# Patient Record
Sex: FEMALE | Race: BLACK OR AFRICAN AMERICAN
[De-identification: names, ages, dates, MRNs, and addresses within clinical notes are randomized per-mention and may not be internally consistent; named-entity substitution may affect disease eponyms.]

---

## 2017-10-27 ENCOUNTER — HOSPITAL ENCOUNTER (EMERGENCY)
Dept: HOSPITAL 62 - ER | Age: 22
LOS: 1 days | Discharge: HOME | End: 2017-10-28
Payer: MEDICAID

## 2017-10-27 VITALS — SYSTOLIC BLOOD PRESSURE: 121 MMHG | DIASTOLIC BLOOD PRESSURE: 94 MMHG

## 2017-10-27 DIAGNOSIS — L21.0: Primary | ICD-10-CM

## 2017-10-27 PROCEDURE — 99283 EMERGENCY DEPT VISIT LOW MDM: CPT

## 2017-10-28 NOTE — ER DOCUMENT REPORT
HPI





- HPI


Pain Level: 2


Notes: 





Patient is a 22-year-old female who presents to the ED complaining of a 

continued rash over the last month to her extremities and a few areas on her 

trunk.  Patient states that the lesions are annular and her PCM possibly 

thought that it could have been Lyme's disease, but patient has not been 

exposed any tick bites or been out in the woods at all.  She denies any fevers 

or joint pains.  Patient states that aside from itching she is otherwise 

asymptomatic.  She still eating and drink without any difficulties.  She is not 

taking any daily p.o. medications.  Denies any exposure to new chemicals or 

detergents or soaps.  Denies any insect bites.  Denies any recent illness.  

Denies any headache, fever, URI, sore throat, chest pain, palpitations, syncope

, cough, shortness of breath, wheeze, dyspnea, abdominal pain, nausea/vomiting/

diarrhea, urinary retention, dysuria, hematuria, joint pains.





- ROS


Notes: 





REVIEW OF SYSTEMS:


CONSTITUTIONAL :  Denies fever,  chills, or sweats.  Denies recent illness.


EENT:   Denies eye, ear, throat, or mouth pain or symptoms.  Denies nasal or 

sinus congestion or discharge.  Denies throat, tongue, or mouth swelling or 

difficulty swallowing.


CARDIOVASCULAR:  Denies chest pain.  Denies palpitations or racing or irregular 

heart beat.  Denies ankle edema.


RESPIRATORY:  Denies cough, cold, or chest congestion.  Denies shortness of 

breath, difficulty breathing, or wheezing.


GASTROINTESTINAL:  Denies abdominal pain or distention.  Denies nausea, vomiting

, or diarrhea.  Denies blood in vomitus, stools, or per rectum.  Denies black, 

tarry stools.  Denies constipation.  


GENITOURINARY:  Denies difficulty urinating, painful urination, burning, 

frequency, blood in urine, or discharge.


MUSCULOSKELETAL:  Denies back or neck pain or stiffness.  Denies joint pain or 

swelling.


SKIN:   see hpi


NEUROLOGICAL:  Denies confusion or altered mental status.  Denies passing out 

or loss of consciousness.  Denies dizziness or lightheadedness.  Denies 

headache.  Denies weakness or paralysis or loss of use of either side.  Denies 

problems with gait or speech.  Denies sensory loss, numbness, or tingling. 





ALL OTHER SYSTEMS REVIEWED AND NEGATIVE.





Dictation was performed using Dragon voice recognition software





- CARDIOVASCULAR


Cardiovascular: DENIES: Chest pain





- REPRODUCTIVE


Reproductive: DENIES: Pregnant:





- DERM


Skin Color: Normal, Pink, Other





Past Medical History





- Social History


Smoking Status: Never Smoker


Family History: Reviewed & Not Pertinent


Patient has suicidal ideation: No


Patient has homicidal ideation: No


Renal/ Medical History: Denies: Hx Peritoneal Dialysis


Surgical Hx: Negative





- Immunizations


Immunizations up to date: Yes


Hx Diphtheria, Pertussis, Tetanus Vaccination: Yes





Vertical Provider Document





- CONSTITUTIONAL


Agree With Documented VS: Yes


Notes: 





PHYSICAL EXAMINATION:





GENERAL: Well-appearing, well-nourished and in no acute distress.





HEAD: Atraumatic, normocephalic.





EYES: Pupils equal round and reactive to light, extraocular movements intact, 

sclera anicteric, conjunctiva are normal.





ENT:  Nares patent and without discharge.  oropharynx clear without exudates.  

No tonsilar hypertrophy or erythema.  Moist mucous membranes.  No sinus 

tenderness.





NECK: Normal range of motion, supple without lymphadenopathy





LUNGS: Breath sounds clear to auscultation bilaterally and equal.  No wheezes 

rales or rhonchi.





HEART: Regular rate and rhythm without murmurs, rubs, gallops.





Musculoskeletal: FROM to passive/active. Strength 5+/5. 





Extremities:  No cyanosis, clubbing, or edema b/l.  Peripheral pulses 2+.  

Capillary refill less than 3 seconds.





NEUROLOGICAL:  Normal speech, normal gait.  Normal sensory, motor exams 





PSYCH: Normal mood, normal affect.





SKIN: dry, annular type lesions with occ scale. Non-tender.  resembles herald 

patch(es) to trunk/extremities with small maculopapular clusters noted as well.





- INFECTION CONTROL


TRAVEL OUTSIDE OF THE [place] IN LAST 30 DAYS: No





- RESPIRATORY


O2 Sat by Pulse Oximetry: 100





Course





- Re-evaluation


Re-evalutation: 





10/28/17 00:12


Patient is an afebrile, well-hydrated, 22-year-old female who presents the ED 

with a pruritic rash, I suspect that this is pityriasis.  Vitals are stable.  

PE is otherwise unremarkable.  Her clinical picture does not match that of Lyme 

disease nor shingles as she has been treated for by her PCM over this last 

month.  Low suspicion for any systemic emergent condition or rash at this time.

  I will send her home with a prescription for triamcinolone that she may 

utilize to help with the pruritus.  Conservative measures otherwise for 

symptoms.  Recheck with your PCM in 3-5 days.  Consider consult with 

dermatology.  Return to the ED with any worsening/concerning symptoms otherwise 

as reviewed in discharge.  Patient is in agreement.





- Vital Signs


Vital signs: 


 











Temp Pulse Resp BP Pulse Ox


 


 97.9 F   85   14   121/94 H  100 


 


 10/27/17 23:00  10/27/17 23:00  10/27/17 23:00  10/27/17 23:00  10/27/17 23:00














Discharge





- Discharge


Clinical Impression: 


 Pruritic rash, Other and unspecified pityriasis





Condition: Stable


Disposition: HOME, SELF-CARE


Instructions:  Pityriasis Rosea ([facility]), Topical Steroid Cream or Ointment ([facility])


Additional Instructions: 


Keep the skin clean and dry


Use steroid cream as directed


Use a triple antibiotic ointment if any break in the skin


Monitor for any worsening symptoms


Recheck with your PCM in 3-5 days


Consider consult dermatology


Return to the ED with any worsening symptoms and/or development of fever, 

headache, chest pain, palpitations, syncope, shortness of breath, trouble 

breathing, abdominal pain, n/v/d, blood in stool/urine, loss of control of bowel

/bladder, urinary retention, muscle weakness/paralysis, joint pains, numbness/

tingling, or other worsening symptoms that are concerning to you.


Prescriptions: 


Triamcinolone Acetonide [Aristocort 0.5% Cream 15 gm] 1 applic TP BID #1 tube


Forms:  Elevated Blood Pressure


Referrals: 


LEONARDO RODRIGUEZ MD [Primary Care Provider] - Follow up as needed


SHERRI BURCIAGA MD [ACTIVE STAFF] - Follow up in 3-5 days


EROS JACOBS DO [ACTIVE STAFF] - Follow up as needed

## 2018-06-02 ENCOUNTER — HOSPITAL ENCOUNTER (EMERGENCY)
Dept: HOSPITAL 62 - ER | Age: 23
Discharge: HOME | End: 2018-06-02
Payer: SELF-PAY

## 2018-06-02 VITALS — SYSTOLIC BLOOD PRESSURE: 144 MMHG | DIASTOLIC BLOOD PRESSURE: 73 MMHG

## 2018-06-02 DIAGNOSIS — R21: Primary | ICD-10-CM

## 2018-06-02 PROCEDURE — 99282 EMERGENCY DEPT VISIT SF MDM: CPT

## 2018-06-02 NOTE — ER DOCUMENT REPORT
HPI





- HPI


Patient complains to provider of: skin rash


Onset: Other - 2 days


Onset/Duration: Persistent


Quality of pain: No pain


Pain Level: Denies


Context: 





Patient with skin rash to bilateral forearm area.  Patient denies any new foods

, medications or detergents.  Patient denies any recent new exposures.  Patient 

does acknowledge a history of eczema.


Exacerbated by: Denies


Relieved by: Denies


Similar symptoms previously: No


Recently seen / treated by doctor: No





- ROS


ROS below otherwise negative: Yes


Systems Reviewed and Negative: Yes All other systems reviewed and negative





- CONSTITUTIONAL


Constitutional: DENIES: Fever, Chills





- CARDIOVASCULAR


Cardiovascular: DENIES: Chest pain





- RESPIRATORY


Respiratory: DENIES: Coughing





- GASTROINTESTINAL


Gastrointestinal: DENIES: Patient vomiting





- REPRODUCTIVE


LMP: 06/01/2018


Reproductive: DENIES: Pregnant:





- DERM


Skin Problems: Rash





Past Medical History





- General


Information source: Patient





- Social History


Smoking Status: Never Smoker


Chew tobacco use (# tins/day): No


Drug Abuse: None


Occupation: Foodservice


Lives with: Family


Family History: Reviewed & Not Pertinent


Patient has suicidal ideation: No


Patient has homicidal ideation: No


Renal/ Medical History: Denies: Hx Peritoneal Dialysis


Skin Medical History: Reports Hx Eczema


Surgical Hx: Negative





- Immunizations


Immunizations up to date: Yes


Hx Diphtheria, Pertussis, Tetanus Vaccination: Yes





Vertical Provider Document





- CONSTITUTIONAL


Agree With Documented VS: Yes


Exam Limitations: No Limitations


General Appearance: WD/WN, No Apparent Distress





- INFECTION CONTROL


TRAVEL OUTSIDE OF THE [place] IN LAST 30 DAYS: No





- HEENT


HEENT: Atraumatic, Normal ENT Exam, Normocephalic





- NECK


Neck: Normal Inspection





- RESPIRATORY


Respiratory: Breath Sounds Normal, No Respiratory Distress





- CARDIOVASCULAR


Cardiovascular: Regular Rate, Regular Rhythm





- BACK


Back: Normal Inspection





- MUSCULOSKELETAL/EXTREMETIES


Musculoskeletal/Extremeties: MAEW





- NEURO


Level of Consciousness: Awake, Alert, Appropriate


Motor/Sensory: No Motor Deficit





- DERM


Integumentary: Warm, Dry, Rash - Maculopapular rash to the volar aspect of 

bilateral forearms





Course





- Re-evaluation


Re-evalutation: 





06/02/18 09:52


Patient presents with skin rash consistent with keratosis pilaris presentation, 

patient afebrile and nontoxic in appearance.  Discussed need for use of 

moisturizers at home.





- Vital Signs


Vital signs: 


 











Temp Pulse Resp BP Pulse Ox


 


 98.5 F   80   16   144/73 H  98 


 


 06/02/18 08:58  06/02/18 08:58  06/02/18 08:58  06/02/18 08:58  06/02/18 08:58














Discharge





- Discharge


Clinical Impression: 


 Skin rash





Condition: Stable


Disposition: HOME, SELF-CARE


Instructions:  Atopic Dermatitis (Eczema) ([facility]), Topical Steroid Cream or 

Ointment ([facility])


Additional Instructions: 


Return immediately for any new or worsening symptoms





Followup with your primary care provider, call tomorrow to make a followup 

appointment








Prescriptions: 


Triamcinolone Acetonide [Aristocort 0.1% Cream] 1 applic TP TID #60 gm


Referrals: 


LEONARDO RODRIGUEZ MD [ACTIVE STAFF] - Follow up as needed

## 2020-07-30 ENCOUNTER — HOSPITAL ENCOUNTER (OUTPATIENT)
Dept: HOSPITAL 62 - RAD | Age: 25
End: 2020-07-30
Attending: NURSE PRACTITIONER
Payer: MEDICAID

## 2020-07-30 DIAGNOSIS — O46.91: Primary | ICD-10-CM

## 2020-07-30 DIAGNOSIS — Z3A.01: ICD-10-CM

## 2020-07-30 PROCEDURE — 76801 OB US < 14 WKS SINGLE FETUS: CPT

## 2020-07-30 NOTE — RADIOLOGY REPORT (SQ)
EXAM DESCRIPTION:  U/S [id number] TRNABD 1GES W/ODOP



IMAGES COMPLETED DATE/TIME:  7/30/2020 3:30 pm



REASON FOR STUDY:  ENCTR FOR SUPERVISION OF OTHER NORMAL PREGNANCY, 1ST TRIMESTER (Z34.81) Z34.81  EN
COUNTER FOR SUPRVSN OF NORMAL PREGNANCY, FIRST TRIM



COMPARISON:  None.



TECHNIQUE:  Transvaginal static and realtime grayscale images acquired of the pelvis. Additional suhail
cted spectral and color Doppler images recorded. All images stored on PACs.

bHCG: Not available.

CLINICAL DATES:  8 weeks 4 days



LIMITATIONS:  None.



FINDINGS:  FETUS:  Single Living intrauterine pregnancy.

ULTRASOUND EGA: 7 weeks 5 days

ULTRASOUND MELODIE: 3/13/2021

EFW: Not applicable less than 20 weeks.

CRL:  1.5 cm

FHR: 153  beats per minute.

FETAL SURVEY: No visualized anomalies.

AMNIOTIC FLUID: Adequate amount.

PLACENTA: Not yet developed due to early gestation.

SUBCHORIONIC BLEED: Yes.

SIZE OF BLEED: 1.2 x 1.1 cm.

UTERUS: No masses. No anomalies.

CERVICAL LENGTH: 3.1 cm.   Closed.

RIGHT ADNEXA: Normal ovary with normal vascular flow.

No adnexal free fluid.

No adnexal masses.

LEFT ADNEXA: Ovary not identified due to poor acoustical window.

No adnexal free fluid.

No adnexal masses.

FREE FLUID: None.

OTHER: No other significant finding.



IMPRESSION:  LIVING INTRAUTERINE PREGNANCY.

EGA 7 weeks 5 days.  Small subchronic hemorrhage.

Trimester of pregnancy: First trimester - 0 to 13 weeks.



TECHNICAL DOCUMENTATION:  JOB ID:  [id number]

 2011 [employer]- All Rights Reserved                            rev-5/18



Reading location - IP/workstation name: [email or web address]